# Patient Record
Sex: FEMALE | Race: WHITE | NOT HISPANIC OR LATINO | Employment: UNEMPLOYED | RURAL
[De-identification: names, ages, dates, MRNs, and addresses within clinical notes are randomized per-mention and may not be internally consistent; named-entity substitution may affect disease eponyms.]

---

## 2022-08-29 ENCOUNTER — OFFICE VISIT (OUTPATIENT)
Dept: FAMILY MEDICINE | Facility: CLINIC | Age: 42
End: 2022-08-29

## 2022-08-29 VITALS
DIASTOLIC BLOOD PRESSURE: 95 MMHG | BODY MASS INDEX: 34.39 KG/M2 | WEIGHT: 214 LBS | HEART RATE: 121 BPM | HEIGHT: 66 IN | SYSTOLIC BLOOD PRESSURE: 162 MMHG

## 2022-08-29 DIAGNOSIS — Z13.9 SCREENING DUE: Primary | ICD-10-CM

## 2022-08-29 PROCEDURE — 99202 OFFICE O/P NEW SF 15 MIN: CPT | Mod: ,,, | Performed by: FAMILY MEDICINE

## 2022-08-29 PROCEDURE — 99202 PR OFFICE/OUTPT VISIT, NEW, LEVL II, 15-29 MIN: ICD-10-PCS | Mod: ,,, | Performed by: FAMILY MEDICINE

## 2022-08-29 NOTE — PROGRESS NOTES
Uriel Galloway IV, DO  [facility] [place]  [address], [place] [address]  Phone: [phone]      Subjective     Name: Nubia Ley   Sex: female  YOB: 1980 (42 y.o.)  MRN: [id number]  Visit Date: 08/29/2022   Chief Complaint: No chief complaint on file.        HISTORY OF PRESENT ILLNESS:    Patient presents for evaluation for involuntary commitment.  She is of normal appearance her head is atraumatic normocephalic.  There is not seem to be the range of motion issues she is hypertensive and tachycardic with her heart sounds are normal.  Her lungs are clear to auscultation bilaterally and there are vesicular breath sounds throughout.  Upon investigation with patient PHQ 9 would equal 0 she is aware of her answers and admits to having problems with these in the past but not lately.  When testing for concrete thinking she was able to realize meaning of a person who lives in a glass house should not throw stones.  Modified False beliefs examination including birds for follow-up and basket versus bucket for answer appropriately.  There is no guarantee the patient being completely honest however she is awake alert oriented x3 she has no concrete thinking no false beliefs is aware of her actions and their consequences and has no intention of self-harm or harm of others.      PAST MEDICAL HISTORY:  Significant Diagnoses - Patient  has no past medical history on file.  Medications - Patient is not on any long-term medications.   Allergies - Patient is allergic to pcn [penicillins].  Surgeries - Patient  has no past surgical history on file.  Family History - Patient family history is not on file.      SOCIAL HISTORY:  Tobacco - Patient  has no history on file for tobacco use.   Alcohol - Patient  has no history on file for alcohol use.   Recreational Drugs - Patient  has no history on file for drug use.       Review of Systems   Constitutional:  Negative for fatigue and  "fever.   HENT:  Negative for nasal congestion and sore throat.    Respiratory:  Negative for cough and shortness of breath.    Cardiovascular:  Negative for chest pain.   Gastrointestinal:  Negative for abdominal pain, nausea and vomiting.   Genitourinary:  Negative for dysuria.   Musculoskeletal:  Negative for myalgias.   Integumentary:  Negative for rash.   Neurological:  Negative for dizziness, weakness and headaches.        No past medical history on file.     Review of patient's allergies indicates:   Allergen Reactions    Pcn [penicillins]         No past surgical history on file.     No family history on file.    No current outpatient medications     Objective     BP (!) 162/95   Pulse (!) 121   Ht 5' 6" (1.676 m)   Wt 97.1 kg (214 lb)   BMI 34.54 kg/m²     Physical Exam  Constitutional:       General: She is not in acute distress.     Appearance: Normal appearance. She is not ill-appearing.   HENT:      Head: Normocephalic and atraumatic.      Right Ear: External ear normal.      Left Ear: External ear normal.   Eyes:      Extraocular Movements: Extraocular movements intact.      Conjunctiva/sclera: Conjunctivae normal.   Cardiovascular:      Rate and Rhythm: Tachycardia present.      Heart sounds: No murmur heard.  Pulmonary:      Effort: Pulmonary effort is normal.   Musculoskeletal:      Cervical back: Normal range of motion.   Skin:     General: Skin is warm and dry.      Coloration: Skin is not jaundiced.      Findings: No rash.   Neurological:      Mental Status: She is alert.   Psychiatric:         Mood and Affect: Mood normal.        All recently obtained labs have been reviewed and discussed in detail with the patient.   Assessment     No diagnosis found.     Plan        Problem List Items Addressed This Visit    None      No follow-ups on file.    Uriel Galloway,   [facility]         "